# Patient Record
Sex: MALE | Race: WHITE | NOT HISPANIC OR LATINO | Employment: OTHER | URBAN - NONMETROPOLITAN AREA
[De-identification: names, ages, dates, MRNs, and addresses within clinical notes are randomized per-mention and may not be internally consistent; named-entity substitution may affect disease eponyms.]

---

## 2021-12-08 ENCOUNTER — OFFICE VISIT (OUTPATIENT)
Dept: CARDIOLOGY | Facility: CLINIC | Age: 56
End: 2021-12-08

## 2021-12-08 VITALS
HEART RATE: 78 BPM | BODY MASS INDEX: 44.38 KG/M2 | SYSTOLIC BLOOD PRESSURE: 120 MMHG | WEIGHT: 299.6 LBS | HEIGHT: 69 IN | DIASTOLIC BLOOD PRESSURE: 84 MMHG | OXYGEN SATURATION: 99 %

## 2021-12-08 DIAGNOSIS — R53.83 OTHER FATIGUE: ICD-10-CM

## 2021-12-08 DIAGNOSIS — R06.02 SHORTNESS OF BREATH: Primary | ICD-10-CM

## 2021-12-08 DIAGNOSIS — Z82.49 FAMILY HISTORY OF CORONARY ARTERIOSCLEROSIS: ICD-10-CM

## 2021-12-08 PROCEDURE — 99204 OFFICE O/P NEW MOD 45 MIN: CPT | Performed by: PHYSICIAN ASSISTANT

## 2021-12-08 PROCEDURE — 93000 ELECTROCARDIOGRAM COMPLETE: CPT | Performed by: PHYSICIAN ASSISTANT

## 2021-12-08 RX ORDER — LEVOTHYROXINE SODIUM 0.05 MG/1
50 TABLET ORAL DAILY
COMMUNITY

## 2021-12-08 RX ORDER — BUSPIRONE HYDROCHLORIDE 15 MG/1
15 TABLET ORAL 2 TIMES DAILY
COMMUNITY

## 2021-12-08 RX ORDER — CETIRIZINE HYDROCHLORIDE 5 MG/1
5 TABLET ORAL DAILY
COMMUNITY

## 2021-12-08 NOTE — PROGRESS NOTES
Subjective   Niels Patel is a 56 y.o. male     Chief Complaint   Patient presents with   • Establish Care   • Leg Swelling       HPI  The patient presents to clinic today to establish cardiovascular care.  This gentleman is referred to the clinic for consideration of further cardiac evaluation.  He has a component of family history of coronary artery disease, ongoing symptoms, and would like consideration of further cardiac evaluation.  The patient reports mainly he experiences of fatigue and dyspnea which does limit activity.  He feels there is a component of deconditioning associated with the same.  Irregardless, symptoms have progressed over the last 6 months or so.  He reports only rare chest discomfort, nothing really of any significance.  He feels mostly what he experiences at that time is likely reflux.  He reports no significant dysrhythmic activity.  He has a component of edema to bilateral lower extremities, but nothing of significance in that regard either.  He has no PND or orthopnea.  He did see his primary care provider recently who follows him routinely with laboratories and evaluation otherwise.  He feels that his laboratory evaluation was unremarkable.  He would like consideration of cardiac testing to ensure no significant cardiac pathology potentially associated with symptoms.  He presents today in that setting.      Current Outpatient Medications   Medication Sig Dispense Refill   • busPIRone (BUSPAR) 15 MG tablet Take 15 mg by mouth 2 (Two) Times a Day. 5 mg AM / 10 MG PM     • cetirizine (zyrTEC) 5 MG tablet Take 5 mg by mouth Daily.     • levothyroxine (SYNTHROID, LEVOTHROID) 50 MCG tablet Take 50 mcg by mouth Daily.       No current facility-administered medications for this visit.       Patient has no known allergies.    Past Medical History:   Diagnosis Date   • Anxiety    • Leg pain     @ night    • Thyroid disorder        Social History     Socioeconomic History   • Marital status:  "   Tobacco Use   • Smoking status: Never Smoker   • Smokeless tobacco: Never Used   Substance and Sexual Activity   • Alcohol use: Yes     Comment: 3-5 a week    • Drug use: Never   • Sexual activity: Defer       Family History   Problem Relation Age of Onset   • Lung cancer Mother    • Lung cancer Father        Review of Systems   Constitutional: Positive for fatigue. Negative for chills and fever.   HENT: Positive for rhinorrhea. Negative for congestion and sore throat.    Eyes: Positive for visual disturbance (glasses).   Respiratory: Negative.  Negative for chest tightness, shortness of breath and wheezing.    Cardiovascular: Positive for leg swelling. Negative for chest pain and palpitations.   Gastrointestinal: Negative.    Endocrine: Negative.    Genitourinary: Negative.    Musculoskeletal: Negative.  Negative for arthralgias, back pain and neck pain.   Skin: Negative.  Negative for rash and wound.   Allergic/Immunologic: Positive for environmental allergies.   Neurological: Negative.  Negative for dizziness, weakness, numbness and headaches.   Hematological: Negative.  Does not bruise/bleed easily.   Psychiatric/Behavioral: Positive for sleep disturbance (falling / staying alseep ).       Objective     Vitals:    12/08/21 0949   BP: 120/84   BP Location: Left arm   Patient Position: Sitting   Pulse: 78   SpO2: 99%   Weight: 136 kg (299 lb 9.6 oz)   Height: 175.3 cm (69\")        /84 (BP Location: Left arm, Patient Position: Sitting)   Pulse 78   Ht 175.3 cm (69\")   Wt 136 kg (299 lb 9.6 oz)   SpO2 99%   BMI 44.24 kg/m²      Lab Results (most recent)     None          Physical Exam  Vitals and nursing note reviewed.   Constitutional:       General: He is not in acute distress.     Appearance: He is well-developed.   HENT:      Head: Normocephalic and atraumatic.   Eyes:      Conjunctiva/sclera: Conjunctivae normal.      Pupils: Pupils are equal, round, and reactive to light.   Neck:      " Vascular: No JVD.      Trachea: No tracheal deviation.   Cardiovascular:      Rate and Rhythm: Normal rate and regular rhythm.      Heart sounds: Normal heart sounds.   Pulmonary:      Effort: Pulmonary effort is normal.      Breath sounds: Normal breath sounds.   Abdominal:      General: Bowel sounds are normal. There is no distension.      Palpations: Abdomen is soft. There is no mass.      Tenderness: There is no abdominal tenderness. There is no guarding or rebound.   Musculoskeletal:         General: No tenderness or deformity. Normal range of motion.      Cervical back: Normal range of motion and neck supple.   Skin:     General: Skin is warm and dry.      Coloration: Skin is not pale.      Findings: No erythema or rash.   Neurological:      Mental Status: He is alert and oriented to person, place, and time.   Psychiatric:         Behavior: Behavior normal.         Thought Content: Thought content normal.         Judgment: Judgment normal.         Procedure     ECG 12 Lead    Date/Time: 12/8/2021 9:57 AM  Performed by: Kurt Sanz PA  Authorized by: Kurt Sanz PA   Comparison: not compared with previous ECG   Comments: Sinus rhythm, rate 74, normal axis, no acute changes noted.                 Assessment/Plan      Diagnosis Plan   1. Shortness of breath  Adult Transthoracic Echo Complete W/ Cont if Necessary Per Protocol    Treadmill Stress Test   2. Other fatigue  Adult Transthoracic Echo Complete W/ Cont if Necessary Per Protocol    Treadmill Stress Test   3. Family history of coronary arteriosclerosis  Adult Transthoracic Echo Complete W/ Cont if Necessary Per Protocol    Treadmill Stress Test     1.  With ongoing dyspnea and fatigue, and risk factors otherwise for CAD, the patient would like to pursue cardiac evaluation.  I do feel that he needs consideration for ischemia assessment and will schedule the patient for regular treadmill stress test for evaluation of the same.    2.  We will  schedule for an echo to evaluate LV size and function, valvular morphologies, and cardiac structure otherwise.    3.  We will make no adjustments in medications.  We will see him back after the above studies are available and recommend him further at that time.  He will call for any issues prior to follow-up.                      Electronically signed by:

## 2021-12-08 NOTE — PATIENT INSTRUCTIONS

## 2022-01-17 ENCOUNTER — APPOINTMENT (OUTPATIENT)
Dept: CARDIOLOGY | Facility: HOSPITAL | Age: 57
End: 2022-01-17

## 2022-01-20 ENCOUNTER — HOSPITAL ENCOUNTER (OUTPATIENT)
Dept: CARDIOLOGY | Facility: HOSPITAL | Age: 57
Discharge: HOME | End: 2022-01-20

## 2022-01-20 DIAGNOSIS — R06.02 SHORTNESS OF BREATH: ICD-10-CM

## 2022-01-20 DIAGNOSIS — Z82.49 FAMILY HISTORY OF CORONARY ARTERIOSCLEROSIS: ICD-10-CM

## 2022-01-20 DIAGNOSIS — R53.83 OTHER FATIGUE: ICD-10-CM

## 2022-01-20 LAB
BH CV ECHO MEAS - ACS: 2.3 CM
BH CV ECHO MEAS - AO MAX PG: 3.9 MMHG
BH CV ECHO MEAS - AO MEAN PG: 2 MMHG
BH CV ECHO MEAS - AO ROOT AREA (BSA CORRECTED): 1.4
BH CV ECHO MEAS - AO ROOT AREA: 9.9 CM^2
BH CV ECHO MEAS - AO ROOT DIAM: 3.6 CM
BH CV ECHO MEAS - AO V2 MAX: 99 CM/SEC
BH CV ECHO MEAS - AO V2 MEAN: 70.7 CM/SEC
BH CV ECHO MEAS - AO V2 VTI: 20.2 CM
BH CV ECHO MEAS - BSA(HAYCOCK): 2.6 M^2
BH CV ECHO MEAS - BSA: 2.5 M^2
BH CV ECHO MEAS - BZI_BMI: 44.2 KILOGRAMS/M^2
BH CV ECHO MEAS - BZI_METRIC_HEIGHT: 175.3 CM
BH CV ECHO MEAS - BZI_METRIC_WEIGHT: 135.6 KG
BH CV ECHO MEAS - EDV(CUBED): 62.1 ML
BH CV ECHO MEAS - EDV(MOD-SP4): 76 ML
BH CV ECHO MEAS - EDV(TEICH): 68.3 ML
BH CV ECHO MEAS - EF(CUBED): 65.4 %
BH CV ECHO MEAS - EF(MOD-SP4): 58 %
BH CV ECHO MEAS - EF(TEICH): 57.5 %
BH CV ECHO MEAS - EF_3D-VOL: 65 %
BH CV ECHO MEAS - ESV(CUBED): 21.5 ML
BH CV ECHO MEAS - ESV(MOD-SP4): 31.9 ML
BH CV ECHO MEAS - ESV(TEICH): 29 ML
BH CV ECHO MEAS - FS: 29.8 %
BH CV ECHO MEAS - IVS/LVPW: 1
BH CV ECHO MEAS - IVSD: 1.5 CM
BH CV ECHO MEAS - LA DIMENSION: 4 CM
BH CV ECHO MEAS - LA/AO: 1.1
BH CV ECHO MEAS - LAT PEAK E' VEL: 6.1 CM/SEC
BH CV ECHO MEAS - LV DIASTOLIC VOL/BSA (35-75): 31 ML/M^2
BH CV ECHO MEAS - LV IVRT: 0.1 SEC
BH CV ECHO MEAS - LV MASS(C)D: 225.8 GRAMS
BH CV ECHO MEAS - LV MASS(C)DI: 92.2 GRAMS/M^2
BH CV ECHO MEAS - LV SYSTOLIC VOL/BSA (12-30): 13 ML/M^2
BH CV ECHO MEAS - LVIDD: 4 CM
BH CV ECHO MEAS - LVIDS: 2.8 CM
BH CV ECHO MEAS - LVLD AP4: 7.6 CM
BH CV ECHO MEAS - LVLS AP4: 5.9 CM
BH CV ECHO MEAS - LVOT AREA (M): 4.2 CM^2
BH CV ECHO MEAS - LVOT AREA: 4.2 CM^2
BH CV ECHO MEAS - LVOT DIAM: 2.3 CM
BH CV ECHO MEAS - LVPWD: 1.5 CM
BH CV ECHO MEAS - MED PEAK E' VEL: 8.49 CM/SEC
BH CV ECHO MEAS - MV A MAX VEL: 78 CM/SEC
BH CV ECHO MEAS - MV DEC TIME: 0.25 SEC
BH CV ECHO MEAS - MV E MAX VEL: 75.8 CM/SEC
BH CV ECHO MEAS - MV E/A: 0.97
BH CV ECHO MEAS - RVDD: 3.3 CM
BH CV ECHO MEAS - SI(AO): 81.6 ML/M^2
BH CV ECHO MEAS - SI(CUBED): 16.6 ML/M^2
BH CV ECHO MEAS - SI(MOD-SP4): 18 ML/M^2
BH CV ECHO MEAS - SI(TEICH): 16 ML/M^2
BH CV ECHO MEAS - SV(AO): 199.9 ML
BH CV ECHO MEAS - SV(CUBED): 40.6 ML
BH CV ECHO MEAS - SV(MOD-SP4): 44.1 ML
BH CV ECHO MEAS - SV(TEICH): 39.3 ML
BH CV ECHO MEASUREMENTS AVERAGE E/E' RATIO: 10.39
BH CV STRESS RECOVERY BP: NORMAL MMHG
BH CV STRESS RECOVERY HR: 93 BPM
MAXIMAL PREDICTED HEART RATE: 165 BPM
MAXIMAL PREDICTED HEART RATE: 165 BPM
PERCENT MAX PREDICTED HR: 87.27 %
STRESS BASELINE BP: NORMAL MMHG
STRESS BASELINE HR: 76 BPM
STRESS PERCENT HR: 103 %
STRESS POST ESTIMATED WORKLOAD: 7 METS
STRESS POST EXERCISE DUR MIN: 6 MIN
STRESS POST EXERCISE DUR SEC: 15 SEC
STRESS POST PEAK BP: NORMAL MMHG
STRESS POST PEAK HR: 144 BPM
STRESS TARGET HR: 140 BPM
STRESS TARGET HR: 140 BPM

## 2022-01-20 PROCEDURE — 93306 TTE W/DOPPLER COMPLETE: CPT

## 2022-01-20 PROCEDURE — 93306 TTE W/DOPPLER COMPLETE: CPT | Performed by: INTERNAL MEDICINE

## 2022-01-20 PROCEDURE — 93017 CV STRESS TEST TRACING ONLY: CPT

## 2022-01-20 PROCEDURE — 93018 CV STRESS TEST I&R ONLY: CPT | Performed by: INTERNAL MEDICINE

## 2022-02-02 ENCOUNTER — TELEPHONE (OUTPATIENT)
Dept: CARDIOLOGY | Facility: CLINIC | Age: 57
End: 2022-02-02

## 2022-02-02 NOTE — TELEPHONE ENCOUNTER
Left detailed message with results as requested. Patient to return call with any concerns.   Lin Nguyễn MA                                                Normal stress test keep follow up  ----- Message -----  From: Carmen Jacobs RegSched Rep  Sent: 1/21/2022   9:35 AM EST  To: MYESHA Colunga      ----- Message -----  From: Jose Martin Pires MD  Sent: 1/20/2022   7:05 PM EST  To: LANCE Hinson      Narrative  1.  Adequate functional capacity without chest pain.  The patient   exercised 6 minutes and 15 seconds on a Jose protocol to a heart rate of   144, systolic blood pressure 179, rate-pressure product of 23,000, and an   O2 consumption of 7 METS.  The patient achieved 87% of age adjusted   maximum predicted heart rate and described dyspnea in stage II.     2.  Physiologic heart rate and blood pressure responses to exercise.     3.  No EKG evidence of ischemia.     4.  No exercise-induced dysrhythmia.        ----- Message -----   From: Jae Jiménez APRN   Sent: 1/22/2022  10:53 AM EST   To: Lin Nguyễn MA   Subject: FW:                                               No acute findings on echo keep follow up       Result Text  Mildly to moderately technically limited study.  Only gross generalizations can be rendered.     1.  LV size and global LV systolic function are grossly preserved.  Visually estimated ejection fraction is greater than 50%.  Formal regional wall motion assessment cannot be performed.  Moderate concentric left ventricular hypertrophy.  Grade 1 diastolic dysfunction.  Mild left atrial enlargement.  Right heart chambers are grossly normal.     2.  Valves are grossly morphologically and physiologically normal.     3.  No pericardial or great vessel pathology.  The proximal aortic root is measured at 3.6 cm with no dissection or aneurysm.     4.  Pulmonary artery pressures cannot be calculated.

## 2024-07-24 ENCOUNTER — TELEPHONE (OUTPATIENT)
Dept: ENDOCRINOLOGY | Facility: CLINIC | Age: 59
End: 2024-07-24
Payer: COMMERCIAL

## 2024-07-24 NOTE — TELEPHONE ENCOUNTER
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 IF WE DON'T HAVE ANYTHING BEFORE THING PT ASKED TO BE PLACED ON THE WAIT LIST AND ASKED FOR A CALL BACK 1 HOUR IN ADVANCE IF WE HAVE AN CANCELLATIONS SO THAT HE CAN MAKE THE APPT

## 2024-10-07 ENCOUNTER — OFFICE VISIT (OUTPATIENT)
Dept: ENDOCRINOLOGY | Facility: CLINIC | Age: 59
End: 2024-10-07
Payer: COMMERCIAL

## 2024-10-07 VITALS
HEIGHT: 69 IN | HEART RATE: 70 BPM | DIASTOLIC BLOOD PRESSURE: 88 MMHG | OXYGEN SATURATION: 98 % | BODY MASS INDEX: 44.05 KG/M2 | SYSTOLIC BLOOD PRESSURE: 126 MMHG | WEIGHT: 297.4 LBS

## 2024-10-07 DIAGNOSIS — E06.3 HASHIMOTO'S THYROIDITIS: ICD-10-CM

## 2024-10-07 DIAGNOSIS — E03.9 HYPOTHYROIDISM (ACQUIRED): Primary | ICD-10-CM

## 2024-10-07 LAB — TSH SERPL DL<=0.05 MIU/L-ACNC: 4.07 UIU/ML (ref 0.27–4.2)

## 2024-10-07 PROCEDURE — 36415 COLL VENOUS BLD VENIPUNCTURE: CPT | Performed by: INTERNAL MEDICINE

## 2024-10-07 PROCEDURE — 99204 OFFICE O/P NEW MOD 45 MIN: CPT | Performed by: INTERNAL MEDICINE

## 2024-10-07 PROCEDURE — 84443 ASSAY THYROID STIM HORMONE: CPT | Performed by: INTERNAL MEDICINE

## 2024-10-07 RX ORDER — LISINOPRIL 5 MG/1
5 TABLET ORAL DAILY
COMMUNITY

## 2024-10-07 RX ORDER — ROPINIROLE 0.5 MG/1
0.5 TABLET, FILM COATED ORAL NIGHTLY
COMMUNITY

## 2024-10-07 NOTE — PROGRESS NOTES
Office Note      Date: 10/07/2024  Patient Name: Niels Patel  MRN: [id number]  : 1965    Chief Complaint   Patient presents with    Thyroid Problem     Thyroid peroxidase    Hypothyroidism       History of Present Illness:   Niels Patel is a 59 y.o. male who presents for Thyroid Problem (Thyroid peroxidase) and Hypothyroidism    He has h/o hypothyroidism for about 15 years.  He is currently taking 50mcg qd.  He is taking this correctly.  He isn't taking any interfering meds concurrently.  He c/o fatigue.  He notes cold intolerance.  He notes some trouble sleeping due to restless legs.  He is on ropinirole which has helped.  He denies any other sxs of hypo- or hyperthyroidism at this time.  He denies any h/o goiter.  He hasn't had any thyroid imaging.  He hasn't noted any change in the size of his neck.  He denies any compressive sxs.      Subjective      Patient was born where: [place].  Facial radiation exposure: No.  High iodine intake: No  Family hx of thyroid disease: Yes, describe: sister.    Review of Systems:   Review of Systems   Constitutional:  Positive for fatigue and unexpected weight change.   HENT:  Positive for hearing loss, postnasal drip and sinus pressure.    Eyes: Negative.    Respiratory: Negative.     Cardiovascular:  Positive for leg swelling.   Gastrointestinal: Negative.    Endocrine: Positive for cold intolerance.   Genitourinary:  Positive for flank pain.   Musculoskeletal:  Positive for arthralgias and myalgias.   Skin: Negative.    Allergic/Immunologic: Positive for environmental allergies.   Neurological: Negative.    Hematological: Negative.    Psychiatric/Behavioral:  Positive for sleep disturbance.        The following portions of the patient's history were reviewed and updated as appropriate: allergies, current medications, past family history, past medical history, past social history, past surgical history, and problem list.    Objective     Visit Vitals  BP  "126/88 (BP Location: Left arm, Patient Position: Sitting, Cuff Size: Adult)   Pulse 70   Ht 175.3 cm (69.02\")   Wt 135 kg (297 lb 6.4 oz)   SpO2 98%   BMI 43.90 kg/m²       Physical Exam:  Physical Exam  Constitutional:       Appearance: He is obese.   HENT:      Head: Normocephalic and atraumatic.   Eyes:      Extraocular Movements: Extraocular movements intact.      Conjunctiva/sclera: Conjunctivae normal.   Neck:      Thyroid: No thyroid mass, thyromegaly or thyroid tenderness.   Cardiovascular:      Rate and Rhythm: Normal rate and regular rhythm.      Pulses: Normal pulses.      Heart sounds: Normal heart sounds.   Pulmonary:      Effort: Pulmonary effort is normal.      Breath sounds: Normal breath sounds.   Abdominal:      General: Bowel sounds are normal.      Palpations: Abdomen is soft.   Musculoskeletal:         General: Normal range of motion.      Cervical back: Normal range of motion and neck supple.   Lymphadenopathy:      Cervical: No cervical adenopathy.   Skin:     General: Skin is warm and dry.   Neurological:      General: No focal deficit present.      Mental Status: He is alert.   Psychiatric:         Mood and Affect: Mood normal.         Behavior: Behavior normal.         Thought Content: Thought content normal.         Judgment: Judgment normal.         Labs:    TSH  No results found for: \"TSHBASE\"     Free T4  No results found for: \"FREET4\"    T3  No results found for: \"Q1LMUJY\"      TPO  No results found for: \"THYROIDAB\"    TG AB  No results found for: \"THGAB\"    TG  No results found for: \"THYROGLB\"    CBC w/DIFF  Lab Results   Component Value Date    WBC 12.99 (H) 05/22/2019    RBC 3.43 (L) 05/22/2019    HGB 10.4 (L) 05/22/2019    HCT 30.6 (L) 05/22/2019    MCV 89.2 05/22/2019    MCH 30.3 05/22/2019    MCHC 34.0 05/22/2019    RDW 13.8 05/22/2019    MPV 11.3 (H) 05/22/2019     05/22/2019    NEUTRORELPCT 87.0 (H) 05/22/2019    LYMPHORELPCT 7.4 (L) 05/22/2019    MONORELPCT 5.2 " 05/22/2019    EOSRELPCT 0.0 05/22/2019    BASORELPCT 0.0 05/22/2019    AUTOIGPER 0.4 (H) 05/22/2019    NEUTROABS 11.31 (H) 05/22/2019    LYMPHSABS 0.96 05/22/2019    MONOSABS 0.67 05/22/2019    EOSABS 0.00 05/22/2019    BASOSABS 0.00 05/22/2019    AUTOIGNUM 0.05 05/22/2019    NRBC 0 05/22/2019           Assessment / Plan      Assessment & Plan:  Diagnoses and all orders for this visit:    1. Hypothyroidism (acquired) (Primary)  Assessment & Plan:  He has treated hypothyroidism.  Continue T4 tx.  Check TSH today.  We discussed goal for TSH.    Orders:  -     TSH; Future    2. Hashimoto's thyroiditis  Assessment & Plan:  He has Hashimoto's thyroiditis.  We discussed the diagnosis.  We discussed the association with hypothyroidism.  We discussed risk for other autoimmune disorders.         Current Outpatient Medications   Medication Instructions    busPIRone (BUSPAR) 15 mg, Oral, 2 Times Daily, 5 mg AM / 10 MG PM     cetirizine (ZYRTEC) 5 mg, Oral, Daily    levothyroxine (SYNTHROID, LEVOTHROID) 50 mcg, Oral, Daily    lisinopril (PRINIVIL,ZESTRIL) 5 mg, Oral, Daily    rOPINIRole (REQUIP) 0.5 mg, Oral, Nightly, Take 1 hour before bedtime.      Return in about 6 months (around 4/7/2025) for Recheck with TSH.    Electronically signed by: Chino Aparicio MD  10/07/2024

## 2024-10-07 NOTE — ASSESSMENT & PLAN NOTE
He has Hashimoto's thyroiditis.  We discussed the diagnosis.  We discussed the association with hypothyroidism.  We discussed risk for other autoimmune disorders.

## 2024-10-08 RX ORDER — LEVOTHYROXINE SODIUM 75 UG/1
75 TABLET ORAL DAILY
Qty: 90 TABLET | Refills: 3 | Status: SHIPPED | OUTPATIENT
Start: 2024-10-08

## 2024-10-09 ENCOUNTER — PATIENT ROUNDING (BHMG ONLY) (OUTPATIENT)
Dept: ENDOCRINOLOGY | Facility: CLINIC | Age: 59
End: 2024-10-09
Payer: COMMERCIAL

## 2024-10-09 NOTE — PROGRESS NOTES
A [email or web address] message has been sent to the patient for patient rounding with [facility] [place] [facility]

## 2025-06-16 ENCOUNTER — OFFICE VISIT (OUTPATIENT)
Dept: ENDOCRINOLOGY | Facility: CLINIC | Age: 60
End: 2025-06-16
Payer: COMMERCIAL

## 2025-06-16 VITALS
HEART RATE: 70 BPM | BODY MASS INDEX: 43.84 KG/M2 | SYSTOLIC BLOOD PRESSURE: 130 MMHG | WEIGHT: 296 LBS | HEIGHT: 69 IN | OXYGEN SATURATION: 97 % | DIASTOLIC BLOOD PRESSURE: 80 MMHG

## 2025-06-16 DIAGNOSIS — E03.9 HYPOTHYROIDISM (ACQUIRED): Primary | ICD-10-CM

## 2025-06-16 DIAGNOSIS — E06.3 HASHIMOTO'S THYROIDITIS: ICD-10-CM

## 2025-06-16 PROCEDURE — 99213 OFFICE O/P EST LOW 20 MIN: CPT | Performed by: INTERNAL MEDICINE

## 2025-06-16 RX ORDER — ALLOPURINOL 200 MG/1
200 TABLET ORAL DAILY
COMMUNITY
Start: 2025-04-15

## 2025-06-16 RX ORDER — LEVOTHYROXINE SODIUM 75 UG/1
75 TABLET ORAL DAILY
Qty: 90 TABLET | Refills: 3 | Status: SHIPPED | OUTPATIENT
Start: 2025-06-16

## 2025-06-16 RX ORDER — COLCHICINE 0.6 MG/1
0.6 TABLET ORAL DAILY
COMMUNITY
Start: 2025-05-10

## 2025-06-16 NOTE — PROGRESS NOTES
"     Office Note      Date: 2025  Patient Name: Niels Patel  MRN: [id number]  : 1965    Chief Complaint   Patient presents with    Hypothyroidism    Hashimoto's Thyroiditis       History of Present Illness:   Niels Patel is a 60 y.o. male who presents for Hypothyroidism and Hashimoto's Thyroiditis    He has h/o hypothyroidism since around . He is currently taking levothyroxine 75mcg qd. He is taking this correctly. He isn't taking any interfering meds concurrently. He notes less fatigue. He notes improvement in cold intolerance. He denies any other sxs of hypo- or hyperthyroidism at this time. He hasn't noted any change in the size of his neck. He denies any compressive sxs.     Subjective      Review of Systems:   Review of Systems   Constitutional:  Positive for fatigue.   Cardiovascular: Negative.    Gastrointestinal: Negative.    Endocrine: Positive for cold intolerance.       The following portions of the patient's history were reviewed and updated as appropriate: allergies, current medications, past family history, past medical history, past social history, past surgical history, and problem list.    Objective     Visit Vitals  /80 (BP Location: Left arm, Patient Position: Sitting, Cuff Size: Adult)   Pulse 70   Ht 175.3 cm (69\")   Wt 134 kg (296 lb)   SpO2 97%   BMI 43.71 kg/m²       Physical Exam:  Physical Exam  Constitutional:       Appearance: Normal appearance.   Neck:      Thyroid: No thyroid mass, thyromegaly or thyroid tenderness.   Lymphadenopathy:      Cervical: No cervical adenopathy.   Neurological:      Mental Status: He is alert.         Labs:    TSH  No results found for: \"TSHBASE\"     Free T4  No results found for: \"FREET4\"    T3  No results found for: \"I5BLNCD\"      TPO  No results found for: \"THYROIDAB\"    TG AB  No results found for: \"THGAB\"    TG  No results found for: \"THYROGLB\"    CBC w/DIFF  Lab Results   Component Value Date    WBC 12.99 (H) 2019 "    RBC 3.43 (L) 05/22/2019    HGB 10.4 (L) 05/22/2019    HCT 30.6 (L) 05/22/2019    MCV 89.2 05/22/2019    MCH 30.3 05/22/2019    MCHC 34.0 05/22/2019    RDW 13.8 05/22/2019    MPV 11.3 (H) 05/22/2019     05/22/2019    NEUTRORELPCT 87.0 (H) 05/22/2019    LYMPHORELPCT 7.4 (L) 05/22/2019    MONORELPCT 5.2 05/22/2019    EOSRELPCT 0.0 05/22/2019    BASORELPCT 0.0 05/22/2019    AUTOIGPER 0.4 (H) 05/22/2019    NEUTROABS 11.31 (H) 05/22/2019    LYMPHSABS 0.96 05/22/2019    MONOSABS 0.67 05/22/2019    EOSABS 0.00 05/22/2019    BASOSABS 0.00 05/22/2019    AUTOIGNUM 0.05 05/22/2019    NRBC 0 05/22/2019           Assessment / Plan      Assessment & Plan:  Diagnoses and all orders for this visit:    1. Hypothyroidism (acquired) (Primary)  Assessment & Plan:  Continue levothyroxine.  Last TSH okay at 2.29.    Orders:  -     TSH; Future    2. Hashimoto's thyroiditis  Assessment & Plan:  No goiter on exam today.      Other orders  -     levothyroxine (SYNTHROID, LEVOTHROID) 75 MCG tablet; Take 1 tablet by mouth Daily.  Dispense: 90 tablet; Refill: 3      Current Outpatient Medications   Medication Instructions    Allopurinol 200 mg, Daily    busPIRone (BUSPAR) 15 mg, 2 Times Daily    cetirizine (ZYRTEC) 5 mg, Daily    colchicine 0.6 mg, Daily    levothyroxine (SYNTHROID, LEVOTHROID) 75 mcg, Oral, Daily    lisinopril (PRINIVIL,ZESTRIL) 5 mg, Daily    rOPINIRole (REQUIP) 0.5 mg, Nightly      Return in about 6 months (around 12/16/2025) for Recheck with TSH.    Electronically signed by: Chino Aparicio MD  06/16/2025